# Patient Record
Sex: MALE | Race: WHITE
[De-identification: names, ages, dates, MRNs, and addresses within clinical notes are randomized per-mention and may not be internally consistent; named-entity substitution may affect disease eponyms.]

---

## 2019-12-26 ENCOUNTER — HOSPITAL ENCOUNTER (INPATIENT)
Dept: HOSPITAL 47 - EC | Age: 54
LOS: 3 days | Discharge: HOME | DRG: 603 | End: 2019-12-29
Attending: HOSPITALIST | Admitting: HOSPITALIST
Payer: COMMERCIAL

## 2019-12-26 DIAGNOSIS — E66.9: ICD-10-CM

## 2019-12-26 DIAGNOSIS — B35.1: ICD-10-CM

## 2019-12-26 DIAGNOSIS — F31.9: ICD-10-CM

## 2019-12-26 DIAGNOSIS — J45.909: ICD-10-CM

## 2019-12-26 DIAGNOSIS — D63.8: ICD-10-CM

## 2019-12-26 DIAGNOSIS — F65.2: ICD-10-CM

## 2019-12-26 DIAGNOSIS — K59.09: ICD-10-CM

## 2019-12-26 DIAGNOSIS — G40.909: ICD-10-CM

## 2019-12-26 DIAGNOSIS — F70: ICD-10-CM

## 2019-12-26 DIAGNOSIS — K21.9: ICD-10-CM

## 2019-12-26 DIAGNOSIS — Z88.0: ICD-10-CM

## 2019-12-26 DIAGNOSIS — J90: ICD-10-CM

## 2019-12-26 DIAGNOSIS — Z79.899: ICD-10-CM

## 2019-12-26 DIAGNOSIS — L03.115: Primary | ICD-10-CM

## 2019-12-26 DIAGNOSIS — Z88.8: ICD-10-CM

## 2019-12-26 DIAGNOSIS — J98.11: ICD-10-CM

## 2019-12-26 DIAGNOSIS — H91.90: ICD-10-CM

## 2019-12-26 DIAGNOSIS — N17.9: ICD-10-CM

## 2019-12-26 DIAGNOSIS — Z79.1: ICD-10-CM

## 2019-12-26 LAB — VANCOMYCIN SERPL-MCNC: 9.2 UG/ML

## 2019-12-26 PROCEDURE — 80202 ASSAY OF VANCOMYCIN: CPT

## 2019-12-26 PROCEDURE — 85027 COMPLETE CBC AUTOMATED: CPT

## 2019-12-26 PROCEDURE — 87040 BLOOD CULTURE FOR BACTERIA: CPT

## 2019-12-26 PROCEDURE — 80048 BASIC METABOLIC PNL TOTAL CA: CPT

## 2019-12-26 PROCEDURE — 71046 X-RAY EXAM CHEST 2 VIEWS: CPT

## 2019-12-26 PROCEDURE — 82565 ASSAY OF CREATININE: CPT

## 2019-12-26 PROCEDURE — 99285 EMERGENCY DEPT VISIT HI MDM: CPT

## 2019-12-26 RX ADMIN — DIVALPROEX SODIUM SCH MG: 500 TABLET, DELAYED RELEASE ORAL at 20:47

## 2019-12-26 RX ADMIN — CEFEPIME HYDROCHLORIDE SCH MLS/HR: 1 INJECTION, POWDER, FOR SOLUTION INTRAMUSCULAR; INTRAVENOUS at 22:21

## 2019-12-26 RX ADMIN — DIVALPROEX SODIUM SCH MG: 250 TABLET, DELAYED RELEASE ORAL at 20:47

## 2019-12-26 RX ADMIN — BACITRACIN ZINC, NEOMYCIN, POLYMYXIN B SCH: 400; 3.5; 5 OINTMENT TOPICAL at 20:54

## 2019-12-26 RX ADMIN — SODIUM CHLORIDE SCH MLS/HR: 9 INJECTION, SOLUTION INTRAVENOUS at 23:02

## 2019-12-26 RX ADMIN — BUPROPION HYDROCHLORIDE SCH MG: 100 TABLET, FILM COATED, EXTENDED RELEASE ORAL at 20:46

## 2019-12-26 RX ADMIN — TAMSULOSIN HYDROCHLORIDE SCH MG: 0.4 CAPSULE ORAL at 20:46

## 2019-12-26 RX ADMIN — BENZTROPINE MESYLATE SCH MG: 0.5 TABLET ORAL at 20:46

## 2019-12-26 RX ADMIN — IPRATROPIUM BROMIDE AND ALBUTEROL SULFATE SCH: .5; 3 SOLUTION RESPIRATORY (INHALATION) at 20:59

## 2019-12-26 NOTE — XR
EXAMINATION TYPE: XR chest 2V

 

DATE OF EXAM: 12/26/2019

 

COMPARISON: 12/26/2019

 

HISTORY: Chest pain. Cough.

 

TECHNIQUE: 2 views

 

FINDINGS: There is some atelectasis at the lung bases. There is no heart failure. There is poor inspi
ration. Bony thorax is intact.

 

IMPRESSION: There is some mild infiltrate and atelectasis at the lung bases and mainly on the left si
de that is slightly worse than exam 6 hours ago. No heart failure.

## 2019-12-26 NOTE — ED
Recheck HPI





- General


Chief Complaint: Shortness of Breath


Stated Complaint: R lower leg celulitis, Pneumonia


Time Seen by Provider: 12/26/19 16:21


Source: EMS, RN notes reviewed


Mode of arrival: EMS


Limitations: altered mental status





- Related Data


                                Home Medications











 Medication  Instructions  Recorded  Confirmed


 


ALPRAZolam [Xanax] 0.5 mg PO TID PRN 06/22/14 06/22/14


 


Benztropine Mesylate 1 mg PO QAM 06/22/14 06/22/14


 


Benztropine Mesylate 2 mg PO HS 06/22/14 06/22/14


 


Divalproex Sodium 250 mg PO HS 06/22/14 06/22/14


 


Divalproex Sodium [Divalproex 500 mg PO HS 06/22/14 06/22/14





Sodium ER]   


 


FLUoxetine HCL [PROzac] 20 mg PO DAILY 06/22/14 06/22/14


 


Tamsulosin HCl [Flomax] 0.4 mg PO DAILY 06/22/14 06/22/14


 


buPROPion SR [Wellbutrin SR] 100 mg PO BID 06/22/14 06/22/14


 


risperiDONE [RisperDAL] 2 mg PO BID 06/22/14 06/22/14











                                    Allergies











Allergy/AdvReac Type Severity Reaction Status Date / Time


 


Penicillins Allergy  Unknown Verified 12/26/19 16:00


 


thioridazine HCl Allergy  Unknown Verified 12/26/19 16:00





[From [facility]]     














Review of Systems


ROS Statement: 


Those systems with pertinent positive or pertinent negative responses have been 

documented in the HPI.





ROS Other: All systems not noted in ROS Statement are negative.





Past Medical History


Past Medical History: Asthma, GERD/Reflux


Additional Past Medical History / Comment(s): MILD MENTAL RETARDATION, 

EXHIBITIONISM, CONSTIPATION, MILD HEARING LOSS, FNGAL TOE NAILS


History of Any Multi-Drug Resistant Organisms: None Reported


Past Surgical History: Orthopedic Surgery


Past Psychological History: Bipolar


Smoking Status: Never smoker


Past Alcohol Use History: None Reported


Past Drug Use History: None Reported





General Exam


Limitations: altered mental status





Course


                                   Vital Signs











  12/26/19 12/26/19 12/26/19





  16:00 16:22 18:07


 


Temperature 99.0 F  


 


Pulse Rate 100  


 


Respiratory 22 20 18





Rate   


 


Blood Pressure 130/92  


 


O2 Sat by Pulse 96  





Oximetry   














Disposition


Clinical Impression: 


 Acute exacerbation of chronic obstructive pulmonary disease, Community acquired

pneumonia, Cellulitis of right leg, Nosocomial pneumonia





Disposition: ADMITTED AS IP TO THIS HOSP


Condition: Fair


Is patient prescribed a controlled substance at d/c from ED?: No

## 2019-12-26 NOTE — P.HPIM
History of Present Illness


49-year-old male who was sent in here for the treatment of right lower leg 

Cellulitis and possible laceration pneumonia.  Patient denied and effusions 

patient denied any cough evidently he was comparing of cough and shortness of 

breath he denied any symptoms to me.  Patient had a chest x-ray which were 

showing bilateral infiltrates as per the reading unfortunately I am unable to 

open of the images.  Because of which I'll continue antibody for now until I can

review the images.  Patient was started on vancomycin.  Patient was on Keflex as

an outpatient with some improvement insulin this patient has significant redness

local is of temperature light of the right lower exudative patient does have 

until dilation but tach and appropriately answer questions.  Patient baseline 

creatinine is around 1 his creatinine presently is 1.5 patient was started on IV

fluids








Review of Systems








REVIEW OF SYSTEMS: 


CONSTITUTIONAL: No fever, no malaise, no fatigue. 


HEENT: No recent visual problems or hearing problems. Denied any sore throat. 


CARDIOVASCULAR: No chest pain, orthopnea, PND, no palpitations, no syncope. 


PULMONARY: No shortness of breath, no cough, no hemoptysis. 


GASTROINTESTINAL: No diarrhea, no nausea, no vomiting, no abdominal pain. 


NEUROLOGICAL: No headaches, no weakness, no numbness. 


HEMATOLOGICAL: Denies any bleeding or petechiae. 


GENITOURINARY: Denies any burning micturition, frequency, or urgency. 


MUSCULOSKELETAL/RHEUMATOLOGICAL: Denies any joint pain, swelling, or any muscle 

pain.  It doesn't right lower exudate as mentioned above


ENDOCRINE: Denies any polyuria or polydipsia. 





The rest of the 14-point review of systems is negative.











Past Medical History


Past Medical History: Asthma, GERD/Reflux


Additional Past Medical History / Comment(s): MILD MENTAL RETARDATION, 

EXHIBITIONISM, CONSTIPATION, MILD HEARING LOSS, FNGAL TOE NAILS


History of Any Multi-Drug Resistant Organisms: None Reported


Past Surgical History: Orthopedic Surgery


Past Psychological History: Bipolar


Smoking Status: Never smoker


Past Alcohol Use History: None Reported


Past Drug Use History: None Reported





Medications and Allergies


                                Home Medications











 Medication  Instructions  Recorded  Confirmed  Type


 


ALPRAZolam [Xanax] 0.5 mg PO TID@0600,1700,2100 06/22/14 12/26/19 History


 


Divalproex Sodium 250 mg PO HS@2100 06/22/14 12/26/19 History


 


FLUoxetine HCL [PROzac] 20 mg PO HS@2100 06/22/14 12/26/19 History


 


Tamsulosin HCl [Flomax] 0.4 mg PO HS@2100 06/22/14 12/26/19 History


 


buPROPion SR [Wellbutrin SR] 100 mg PO BID@0600,2100 06/22/14 12/26/19 History


 


Acetaminophen Tab [Tylenol Tab] 1,000 mg PO Q4H PRN 12/26/19 12/26/19 History


 


Benztropine Mesylate [Cogentin] 0.5 mg PO TID@0600,1700,2100 12/26/19 12/26/19 

History


 


Divalproex Sodium [Depakote] 1,000 mg PO BID@0600,2100 12/26/19 12/26/19 History


 


Docusate [Colace] 100 mg PO BID PRN 12/26/19 12/26/19 History


 


FLUoxetine HCL [PROzac] 40 mg PO DAILY@0600 12/26/19 12/26/19 History


 


Meloxicam [Mobic] 15 mg PO DAILY@0600 12/26/19 12/26/19 History


 


Multivitamins, Thera [Multivitamin 1 tab PO DAILY@0600 12/26/19 12/26/19 History





(formulary)]    


 


Neomycin/Bacitracin/Polymyxinb 1 applic TOPICAL TID 12/26/19 12/26/19 History





[Neosporin Ointment]    


 


Nitrofurantoin Monohyd/M-Cryst 100 mg PO BID@0600,2100 12/26/19 12/26/19 History





[Macrobid]    


 


Polyethylene Glycol 3350 [Miralax] 17 gm PO BID PRN 12/26/19 12/26/19 History


 


diphenhydrAM/PE/DM/ACETAMIN/GG 2 tab PO Q4H PRN 12/26/19 12/26/19 History





[Mucinex Fast-Max Day-Nite Cold]    


 


risperiDONE [RisperDAL] 4 mg PO BID@0600,2100 12/26/19 12/26/19 History








                                    Allergies











Allergy/AdvReac Type Severity Reaction Status Date / Time


 


Penicillins Allergy  Unknown Verified 12/26/19 18:38


 


thioridazine HCl Allergy  Unknown Verified 12/26/19 18:38





[From [facility]]     














Physical Exam


Vitals: 


                                   Vital Signs











  Temp Pulse Resp BP Pulse Ox


 


 12/26/19 18:45  99.6 F  77  20  138/75  96


 


 12/26/19 18:07    18  


 


 12/26/19 16:22    20  


 


 12/26/19 16:00  99.0 F  100  22  130/92  96








                                Intake and Output











 12/26/19 12/26/19 12/26/19





 06:59 14:59 22:59


 


Other:   


 


  Weight   97.522 kg




















PHYSICAL EXAMINATION: 





GENERAL: The patient is alert and oriented x3, not in any acute distress. Well 

developed, well nourished. 


HEENT: Pupils are round and equally reacting to light. EOMI. No scleral icterus.

No conjunctival pallor. Normocephalic, atraumatic. No pharyngeal erythema. No 

thyromegaly. 


CARDIOVASCULAR: S1 and S2 present. No murmurs, rubs, or gallops. 


PULMONARY: Chest is clear to auscultation, no wheezing or crackles. 


ABDOMEN: Soft, nontender, nondistended, normoactive bowel sounds. No palpable 

organomegaly. 


MUSCULOSKELETAL: No joint swelling or deformity.


EXTREMITIES: No cyanosis, clubbing, or pedal edema.  Has redness in the right 

leg


NEUROLOGICAL: Gross neurological examination did not reveal any focal deficits. 


SKIN: No rashes. 














Assessment and Plan


Plan: 


-Celexa the lower exudate: Patient will be started on Vanco mycin patient failed

outpatient therapy infectious disease will be consulted 


acute renal failure  prerenal azotemia expected given to improve with IV fluids 

and repeat basic metabolic profile will be obtained


-mental retardation and patient will because of which patient is on multiple 

antipsychotic medications which will be resumed and continued


3 chronic constipation


-Seizure disorder


-Gastroesophageal reflux disease


-Possibility of pneumonia cannot completely rule it out patient will be can you 

done cefepime for now but I do not believe levofloxacin is deciliter liver this 

can you patient is ALLERGIC to penicillins I reviewed the images tomorrow as I 

cannot open the images today until then cefepime will be continued.

## 2019-12-27 LAB
ANION GAP SERPL CALC-SCNC: 8 MMOL/L
BUN SERPL-SCNC: 27 MG/DL (ref 9–20)
CALCIUM SPEC-MCNC: 8.7 MG/DL (ref 8.4–10.2)
CHLORIDE SERPL-SCNC: 103 MMOL/L (ref 98–107)
CO2 SERPL-SCNC: 25 MMOL/L (ref 22–30)
ERYTHROCYTE [DISTWIDTH] IN BLOOD BY AUTOMATED COUNT: 3.35 M/UL (ref 4.3–5.9)
ERYTHROCYTE [DISTWIDTH] IN BLOOD: 12.7 % (ref 11.5–15.5)
GLUCOSE SERPL-MCNC: 91 MG/DL (ref 74–99)
HCT VFR BLD AUTO: 31.7 % (ref 39–53)
HGB BLD-MCNC: 10.7 GM/DL (ref 13–17.5)
MCH RBC QN AUTO: 31.9 PG (ref 25–35)
MCHC RBC AUTO-ENTMCNC: 33.7 G/DL (ref 31–37)
MCV RBC AUTO: 94.6 FL (ref 80–100)
PLATELET # BLD AUTO: 197 K/UL (ref 150–450)
POTASSIUM SERPL-SCNC: 4.2 MMOL/L (ref 3.5–5.1)
SODIUM SERPL-SCNC: 136 MMOL/L (ref 137–145)
WBC # BLD AUTO: 17 K/UL (ref 3.8–10.6)

## 2019-12-27 RX ADMIN — CEFEPIME HYDROCHLORIDE SCH MLS/HR: 1 INJECTION, POWDER, FOR SOLUTION INTRAMUSCULAR; INTRAVENOUS at 10:01

## 2019-12-27 RX ADMIN — TAMSULOSIN HYDROCHLORIDE SCH MG: 0.4 CAPSULE ORAL at 21:58

## 2019-12-27 RX ADMIN — IPRATROPIUM BROMIDE AND ALBUTEROL SULFATE SCH: .5; 3 SOLUTION RESPIRATORY (INHALATION) at 08:08

## 2019-12-27 RX ADMIN — SODIUM CHLORIDE SCH MLS/HR: 9 INJECTION, SOLUTION INTRAVENOUS at 10:04

## 2019-12-27 RX ADMIN — DIVALPROEX SODIUM SCH MG: 250 TABLET, DELAYED RELEASE ORAL at 21:59

## 2019-12-27 RX ADMIN — IPRATROPIUM BROMIDE AND ALBUTEROL SULFATE SCH: .5; 3 SOLUTION RESPIRATORY (INHALATION) at 20:55

## 2019-12-27 RX ADMIN — DIVALPROEX SODIUM SCH MG: 500 TABLET, DELAYED RELEASE ORAL at 22:00

## 2019-12-27 RX ADMIN — BENZTROPINE MESYLATE SCH MG: 0.5 TABLET ORAL at 16:41

## 2019-12-27 RX ADMIN — BENZTROPINE MESYLATE SCH MG: 0.5 TABLET ORAL at 21:59

## 2019-12-27 RX ADMIN — IPRATROPIUM BROMIDE AND ALBUTEROL SULFATE SCH: .5; 3 SOLUTION RESPIRATORY (INHALATION) at 12:20

## 2019-12-27 RX ADMIN — BACITRACIN ZINC, NEOMYCIN, POLYMYXIN B SCH APPLIC: 400; 3.5; 5 OINTMENT TOPICAL at 21:58

## 2019-12-27 RX ADMIN — IPRATROPIUM BROMIDE AND ALBUTEROL SULFATE SCH: .5; 3 SOLUTION RESPIRATORY (INHALATION) at 16:18

## 2019-12-27 RX ADMIN — BUPROPION HYDROCHLORIDE SCH MG: 100 TABLET, FILM COATED, EXTENDED RELEASE ORAL at 05:41

## 2019-12-27 RX ADMIN — BUPROPION HYDROCHLORIDE SCH MG: 100 TABLET, FILM COATED, EXTENDED RELEASE ORAL at 21:58

## 2019-12-27 RX ADMIN — ENOXAPARIN SODIUM SCH MG: 40 INJECTION SUBCUTANEOUS at 10:03

## 2019-12-27 RX ADMIN — DIVALPROEX SODIUM SCH MG: 500 TABLET, DELAYED RELEASE ORAL at 05:41

## 2019-12-27 RX ADMIN — BACITRACIN ZINC, NEOMYCIN, POLYMYXIN B SCH: 400; 3.5; 5 OINTMENT TOPICAL at 10:03

## 2019-12-27 RX ADMIN — BENZTROPINE MESYLATE SCH MG: 0.5 TABLET ORAL at 05:41

## 2019-12-27 RX ADMIN — BACITRACIN ZINC, NEOMYCIN, POLYMYXIN B SCH: 400; 3.5; 5 OINTMENT TOPICAL at 16:34

## 2019-12-27 NOTE — P.PN
Subjective





Patient was admitted facilities of the right lower extremity which is 

significantly improved patient was also believed to have aspiration pneumonia 

although patient doesn't have any clinical symptoms of that and I reviewed the 

chest x-ray do not believe patient has aspiration pneumonia from that patient 

does have some chronic changes with just some small mild bilateral pleural 

effusions.  Patient's vancomycin.  He will be discontinued and patient was 

started on ceftezolin and he probably can be discharged tomorrow on Keflex.





Constitutional: Denied any fatigue denied any fever.


Cardio vascular: denied any chest pain, palpitations


Gastrointestinal denied any nausea vomiting


Pulmonary: Denied any shortness of breath cough


Neurologic denied any new focal deficits





All inpatient medications were reviewed and appropriate changes in these med

ications as dictated in the interval history and assessment and plan.





Objective





- Vital Signs


Vital signs: 


                                   Vital Signs











Temp  97.1 F L  12/27/19 12:06


 


Pulse  91   12/27/19 12:06


 


Resp  20   12/27/19 12:06


 


BP  138/79   12/27/19 12:06


 


Pulse Ox  95   12/27/19 12:06








                                 Intake & Output











 12/26/19 12/27/19 12/27/19





 18:59 06:59 18:59


 


Intake Total  550 


 


Balance  550 


 


Weight 102 kg  


 


Intake:   


 


  Intake, IV Titration  550 





  Amount   


 


    Cefepime 1 gm In Sodium  50 





    Chloride 0.9% 50 ml @ 100   





    mls/hr IVPB Q12HR [facility] Rx   





    #:[id number]   


 


    Vancomycin 2,000 mg In  500 





    Sodium Chloride 0.9% 500   





    ml 500 ml @ 167 mls/hr   





    IVPB Q12HR [facility] Rx#:   





    [id number]   


 


Other:   


 


  Voiding Method  Toilet Toilet


 


  # Voids  1 1


 


  # Bowel Movements   1














- Exam





PHYSICAL EXAMINATION: 





GENERAL: The patient is alert and oriented x3, not in any acute distress. Well 

developed, well nourished. 


HEENT: Pupils are round and equally reacting to light. EOMI. No scleral icterus.

No conjunctival pallor. Normocephalic, atraumatic. No pharyngeal erythema. No 

thyromegaly. 


CARDIOVASCULAR: S1 and S2 present. No murmurs, rubs, or gallops. 


PULMONARY: Chest is clear to auscultation, no wheezing or crackles. 


ABDOMEN: Soft, nontender, nondistended, normoactive bowel sounds. No palpable 

organomegaly. 


MUSCULOSKELETAL: No joint swelling or deformity.


EXTREMITIES: No cyanosis, clubbing, or pedal edema.  Significant improvement in 

the circumferential redness of the right leg


NEUROLOGICAL: Gross neurological examination did not reveal any focal deficits. 


SKIN: No rashes. 














- Labs


CBC & Chem 7: 


                                 12/27/19 08:33





                                 12/27/19 08:33


Labs: 


                  Abnormal Lab Results - Last 24 Hours (Table)











  12/26/19 12/27/19 12/27/19 Range/Units





  19:24 08:33 08:33 


 


WBC   17.0 H   (3.8-10.6)  k/uL


 


RBC   3.35 L   (4.30-5.90)  m/uL


 


Hgb   10.7 L   (13.0-17.5)  gm/dL


 


Hct   31.7 L   (39.0-53.0)  %


 


Sodium    136 L  (137-145)  mmol/L


 


BUN    27 H  (9-20)  mg/dL


 


Creatinine  1.31 H    (0.66-1.25)  mg/dL














Assessment and Plan


Plan: 


-Cellulitis of the right lower extremity improving symptoms antibiotics as 

mentioned above


 chronic constipation


-Seizure disorder


-Gastroesophageal reflux disease


-Cefepime was discontinued as there is no evidence of aspiration pneumonia

## 2019-12-27 NOTE — XR
EXAMINATION TYPE: XR chest 2V

 

DATE OF EXAM: 12/27/2019

 

COMPARISON: 12/26/2019

 

HISTORY: Shortness of breath

 

TECHNIQUE:  Frontal and lateral views of the chest are obtained.

 

FINDINGS:

 

Scattered senescent parenchymal changes noted. Hyperinflation compatible with COPD. 

 

Patchy basilar infiltrates persist. Correlate for pneumonia. Progress studies are advised

 

Heart size is stable.

 

Mediastinal structures are stable and grossly unremarkable.

 

No evidence for hilar prominence.

 

Degenerative changes dorsal spine. 

 

IMPRESSION:

1. Patchy basilar infiltrates persist. Correlate for pneumonia. Progress studies are advised

## 2019-12-28 LAB
ANION GAP SERPL CALC-SCNC: 7 MMOL/L
BUN SERPL-SCNC: 19 MG/DL (ref 9–20)
CALCIUM SPEC-MCNC: 9 MG/DL (ref 8.4–10.2)
CHLORIDE SERPL-SCNC: 103 MMOL/L (ref 98–107)
CO2 SERPL-SCNC: 27 MMOL/L (ref 22–30)
ERYTHROCYTE [DISTWIDTH] IN BLOOD BY AUTOMATED COUNT: 3.58 M/UL (ref 4.3–5.9)
ERYTHROCYTE [DISTWIDTH] IN BLOOD: 12.7 % (ref 11.5–15.5)
GLUCOSE SERPL-MCNC: 94 MG/DL (ref 74–99)
HCT VFR BLD AUTO: 34.6 % (ref 39–53)
HGB BLD-MCNC: 11.1 GM/DL (ref 13–17.5)
MCH RBC QN AUTO: 31.1 PG (ref 25–35)
MCHC RBC AUTO-ENTMCNC: 32.2 G/DL (ref 31–37)
MCV RBC AUTO: 96.6 FL (ref 80–100)
PLATELET # BLD AUTO: 194 K/UL (ref 150–450)
POTASSIUM SERPL-SCNC: 3.7 MMOL/L (ref 3.5–5.1)
SODIUM SERPL-SCNC: 137 MMOL/L (ref 137–145)
WBC # BLD AUTO: 13.3 K/UL (ref 3.8–10.6)

## 2019-12-28 RX ADMIN — BENZTROPINE MESYLATE SCH MG: 0.5 TABLET ORAL at 17:30

## 2019-12-28 RX ADMIN — BENZTROPINE MESYLATE SCH MG: 0.5 TABLET ORAL at 21:05

## 2019-12-28 RX ADMIN — TRIAMCINOLONE ACETONIDE SCH APPLIC: 1 CREAM TOPICAL at 16:52

## 2019-12-28 RX ADMIN — BENZTROPINE MESYLATE SCH MG: 0.5 TABLET ORAL at 06:11

## 2019-12-28 RX ADMIN — DIVALPROEX SODIUM SCH MG: 250 TABLET, DELAYED RELEASE ORAL at 21:04

## 2019-12-28 RX ADMIN — BUPROPION HYDROCHLORIDE SCH MG: 100 TABLET, FILM COATED, EXTENDED RELEASE ORAL at 06:10

## 2019-12-28 RX ADMIN — DIVALPROEX SODIUM SCH MG: 500 TABLET, DELAYED RELEASE ORAL at 06:11

## 2019-12-28 RX ADMIN — BACITRACIN ZINC, NEOMYCIN, POLYMYXIN B SCH APPLIC: 400; 3.5; 5 OINTMENT TOPICAL at 16:52

## 2019-12-28 RX ADMIN — IPRATROPIUM BROMIDE AND ALBUTEROL SULFATE SCH: .5; 3 SOLUTION RESPIRATORY (INHALATION) at 08:54

## 2019-12-28 RX ADMIN — BACITRACIN ZINC, NEOMYCIN, POLYMYXIN B SCH APPLIC: 400; 3.5; 5 OINTMENT TOPICAL at 08:27

## 2019-12-28 RX ADMIN — NITROFURANTOIN (MONOHYDRATE/MACROCRYSTALS) SCH MG: 75; 25 CAPSULE ORAL at 21:03

## 2019-12-28 RX ADMIN — IPRATROPIUM BROMIDE AND ALBUTEROL SULFATE SCH: .5; 3 SOLUTION RESPIRATORY (INHALATION) at 22:15

## 2019-12-28 RX ADMIN — TAMSULOSIN HYDROCHLORIDE SCH MG: 0.4 CAPSULE ORAL at 21:03

## 2019-12-28 RX ADMIN — ENOXAPARIN SODIUM SCH MG: 40 INJECTION SUBCUTANEOUS at 08:27

## 2019-12-28 RX ADMIN — BACITRACIN ZINC, NEOMYCIN, POLYMYXIN B SCH APPLIC: 400; 3.5; 5 OINTMENT TOPICAL at 21:06

## 2019-12-28 RX ADMIN — NYSTATIN SCH APPLIC: 100000 CREAM TOPICAL at 16:52

## 2019-12-28 RX ADMIN — BUPROPION HYDROCHLORIDE SCH MG: 100 TABLET, FILM COATED, EXTENDED RELEASE ORAL at 21:05

## 2019-12-28 RX ADMIN — IPRATROPIUM BROMIDE AND ALBUTEROL SULFATE SCH: .5; 3 SOLUTION RESPIRATORY (INHALATION) at 12:05

## 2019-12-28 RX ADMIN — DIVALPROEX SODIUM SCH MG: 500 TABLET, DELAYED RELEASE ORAL at 21:04

## 2019-12-28 RX ADMIN — IPRATROPIUM BROMIDE AND ALBUTEROL SULFATE SCH: .5; 3 SOLUTION RESPIRATORY (INHALATION) at 17:04

## 2019-12-28 NOTE — CONS
CONSULTATION



DATE OF SERVICE:

12/27/2019.



REASON FOR CONSULTATION:

Right lower extremity cellulitis with question of pneumonia.



HISTORY OF PRESENT ILLNESS:

The patient is a 54-year-old  male, apparently initially evaluated at an

outside facility where the patient presented with increasing shortness of breath and

cough and swelling and redness of his right lower extremity that apparently started a

few days before he presented to hospital.  The patient denies any history of any

trauma.  Pain to the right leg is more of a dull aching 2 to 3 out of 10 and no

radiation with associated swelling, redness, currently with no open wound or any

drainage and no history of any trauma.  The patient also complaining of some shortness

of breath.  Chest x-ray at an outside facility shows bilateral basilar infiltrate.  The

patient did have a low-grade fever and elevated white count.  The patient has been

diagnosed with right lower extremity cellulitis as well as pneumonia.  The patient has

been subsequently transferred to the [facility] for further management of his

condition.  The patient was started on vancomycin, cefepime.  Infectious Disease was

consulted for further recommendations regarding antibiotic therapy.



REVIEW OF SYSTEMS:

Positive points have been mentioned in HPI.  Rest of systems negative.



PAST MEDICAL HISTORY:

Past medical history significant for mild mental retardation, history of asthma and

gastroesophageal reflux disease, constipation, and bipolar disorder.



PAST SURGICAL HISTORY:

Orthopedic surgeries.



SOCIAL HISTORY:

No history of smoking, drinking or drug use.



FAMILY HISTORY:

No pertinent findings noticed.



ALLERGIES:

TO PENICILLIN, however, has tolerated cephalosporins without any problem.



MEDICATIONS:

Include the patient is currently on Tylenol, Ventolin, DuoNeb, Xanax, Cogentin,

Wellbutrin, cefazolin 2 g q.8 hours, Depakote, Lovenox,  Prozac,  MiraLAX, Risperdal.



PHYSICAL EXAMINATION:

Blood pressure is 131/81 with a pulse of 76, temperature 98.2.  He is 93% on room air.

General description is a middle-aged male lying in bed in no distress.  No tachypnea or

accessory muscles of respiration use.

HEENT: Examination shows slight pallor.  No scleral icterus.  Oral mucosal membranes

are moist with no pharyngeal erythema or thrush.  Neck:  Trachea central.  No

thyromegaly.  Lungs:  Unlabored breathing.  Clear to auscultation.  No wheeze or

crackles.  Heart S1, S2.  Regular rate and rhythm.

ABDOMEN:  Soft, no tenderness.  No guarding or rigidity.  Extremities:  Right leg

mostly posteriorly did have swelling and redness.  Warm to touch.  No open wound or any

drainage.  Neurological:  Patient is awake, alert, oriented times three.  Mood and

affect normal.



LABS:

Hemoglobin is 10.7, white count 65424.  BUN of 27, creatinine 1.14.  Chest x-ray with

mostly bibasilar atelectasis.



DIAGNOSTIC IMPRESSION AND PLAN:

1. Patient admitted to the hospital with increasing pain, swelling, redness of right

    lower extremity, likely with cellulitis with diffuse swelling and redness, likely

    streptococcal disease.  Clinically doubt MRSA gram-negative infection.

2. The patient with some shortness of breath, currently breathing comfortably on room

    air with no fever.  Chest x-ray finding more likely representing atelectasis rather

    than pneumonia.



PLAN:

1. Cefepime.  Vancomycin discontinued as per discussion with the admitting team.

2. We will start the patient on cefazolin 2 g q.8 hours.

3. Marked the area of redness on the right leg.

4. We will follow up on clinical condition and culture to further adjust medication if

    needed.

Thank you for this consultation. We will follow this patient along with you.





MMODL / IJN: [id number] / Job#: [id number]

## 2019-12-28 NOTE — PN
PROGRESS NOTE



DATE OF SERVICE:

11/28/2019



This 54-year-old gentleman was admitted with significant cellulitis of the right lower

extremity being treated with IV antibiotics.  The patient was also suspected to have

aspiration pneumonia. Chest x-ray showed possibly atelectasis.  The patient is being

closely monitored at this time.  The patient is on empiric bronchodilators also.

Patient closely monitored.



PAST MEDICAL HISTORY:

Reviewed.



REVIEW OF SYSTEMS:

CARDIOVASCULAR SYSTEM:  No palpitations.

RESPIRATORY:  As mentioned earlier.

GI: No nausea.

:  No dysuria.

NERVOUS SYSTEM: No numbness or weakness.



CURRENT MEDICATIONS:

1. Tylenol.

2. Ventolin.

3. DuoNeb nebulizer.

4. Xanax.

5. Cogentin.

6. Wellbutrin XL.

7. Cefazolin.

8. Depakote.

9. Colace.

10.Lovenox.

11.Prozac.

12.Replacement protocol.

13.Risperdal.

14.Flomax.

15.Kenalog.

Dose and schedule as well as route of administration was reviewed.



PHYSICAL EXAM:

Patient is alert, oriented x3.  Pulse 72, blood pressure 139/75, respirations 17,

temperature 97.4, pulse ox 94% on room air skin:

HEENT:  Conjunctivae normal. Oral mucosa moist.

NECK:  No jugular venous distention.  No lymph node enlargement.

CARDIOVASCULAR:  S1, S2.

RESPIRATORY: Diminished breath sounds at the bases. Bilateral scattered rhonchi and

crackles.

ABDOMEN: Soft, nontender.  No masses palpable.

LEGS: Right leg cellulitis present.

NERVOUS SYSTEM: No focal deficits.



LABS:

At this time shows WBC 13.2, hemoglobin 11.3.



ASSESSMENT:

1. Acute severe cellulitis of the right leg with possible SIRS.

2. Increased WBC.

3. Atelectasis, pneumonia unlikely.

4. Seizure disorder.

5. Gastroesophageal reflux disease.

6. Increased WBC.

7. Anemia of chronic disease.

8. Obesity with body mass index 30.5.

9. History of asthma.

10.Mild mental retardation and extubation.

11.History of constipation.

12.History of mild hearing loss.

13.History of bipolar.



RECOMMENDATIONS AND DISCUSSION:

In this 54-year-old gentleman who presented with multiple complex medical issues, we

will monitor the patient closely, continue the current medication and symptomatic

treatment.  Will continue the broad-spectrum IV antibiotics.  Continue the

bronchodilators.  Continue rest of medications.  DVT prophylaxis.  Otherwise, resume

the home medications.  Closely follow with Infectious Disease and further

recommendations to follow.





MMODL / IJN: [id number] / Job#: [id number]

## 2019-12-28 NOTE — PN
PROGRESS NOTE



DATE OF SERVICE:

12/28/2019



REASON FOR FOLLOWUP:

Right lower extremity cellulitis.



INTERVAL HISTORY:

The patient is currently afebrile.  The patient is breathing comfortably.  The patient

denies having any chest pain or any cough.  No nausea, vomiting.  No abdominal pain and

_____





MMODL / IJN: [id number] / Job#: [id number]

## 2019-12-29 VITALS
TEMPERATURE: 98.8 F | RESPIRATION RATE: 17 BRPM | SYSTOLIC BLOOD PRESSURE: 124 MMHG | HEART RATE: 93 BPM | DIASTOLIC BLOOD PRESSURE: 76 MMHG

## 2019-12-29 RX ADMIN — DIVALPROEX SODIUM SCH MG: 500 TABLET, DELAYED RELEASE ORAL at 05:22

## 2019-12-29 RX ADMIN — TRIAMCINOLONE ACETONIDE SCH APPLIC: 1 CREAM TOPICAL at 07:54

## 2019-12-29 RX ADMIN — BACITRACIN ZINC, NEOMYCIN, POLYMYXIN B SCH APPLIC: 400; 3.5; 5 OINTMENT TOPICAL at 07:54

## 2019-12-29 RX ADMIN — BENZTROPINE MESYLATE SCH MG: 0.5 TABLET ORAL at 16:32

## 2019-12-29 RX ADMIN — BENZTROPINE MESYLATE SCH MG: 0.5 TABLET ORAL at 05:23

## 2019-12-29 RX ADMIN — NITROFURANTOIN (MONOHYDRATE/MACROCRYSTALS) SCH MG: 75; 25 CAPSULE ORAL at 05:22

## 2019-12-29 RX ADMIN — IPRATROPIUM BROMIDE AND ALBUTEROL SULFATE SCH: .5; 3 SOLUTION RESPIRATORY (INHALATION) at 16:20

## 2019-12-29 RX ADMIN — IPRATROPIUM BROMIDE AND ALBUTEROL SULFATE SCH: .5; 3 SOLUTION RESPIRATORY (INHALATION) at 10:39

## 2019-12-29 RX ADMIN — NYSTATIN SCH APPLIC: 100000 CREAM TOPICAL at 07:54

## 2019-12-29 RX ADMIN — BUPROPION HYDROCHLORIDE SCH MG: 100 TABLET, FILM COATED, EXTENDED RELEASE ORAL at 05:22

## 2019-12-29 RX ADMIN — ENOXAPARIN SODIUM SCH MG: 40 INJECTION SUBCUTANEOUS at 07:53

## 2019-12-29 RX ADMIN — IPRATROPIUM BROMIDE AND ALBUTEROL SULFATE SCH: .5; 3 SOLUTION RESPIRATORY (INHALATION) at 13:53

## 2019-12-29 NOTE — PN
PROGRESS NOTE



DATE OF SERVICE:

12/29/2019.



REASON FOR FOLLOWUP:

Right lower extremity cellulitis.



INTERVAL HISTORY:

The patient is currently afebrile, has been breathing comfortably.  Denies any chest

pain or any cough.  No nausea, vomiting or pain to the right leg area.



PHYSICAL EXAMINATION:

Blood pressure 124/76, pulse of 93, temperature 98.8.  He is 94% on room air. General

description is a middle-aged male up in the room in no distress.  Respiratory system:

Unlabored breathing,  clear to auscultation anteriorly.  Heart S1, S2.  Regular rate

and rhythm.  Abdomen soft, no tenderness. Right leg has been dressed up by the RN, did

mention overall improvement in the redness.



LABS:

Creatinine 0.90.  No CBC was done today.



DIAGNOSTIC IMPRESSION AND PLAN:

Patient with acute right lower extremity cellulitis with diffuse swelling with _____

disease, improvement on cefazolin.  Antibiotic was switched over to Keflex 500 mg p.o.

3 times a day for 7 days.  Script sent to pharmacy. Local care to continue with Mycolog

cream and Ace wrap to keep the swelling down.





MMODL / IJN: [id number] / Job#: [id number]

## 2019-12-30 NOTE — DS
DISCHARGE SUMMARY



DATE OF SERVICE:

12/29/2019



FINAL DIAGNOSES:

1. Acute severe cellulitis of the right leg with possible systemic inflammatory

    response syndrome.

2. Increased WBC.

3. Atelectasis, pneumonia unlikely.

4. Seizure disorder history.

5. History of gastroesophageal reflux disease.

6. Anemia of chronic disease.

7. Obesity with body mass index of 30.5.

8. History of asthma.

9. Mild mental retardation.

10.History of constipation.

11.History of mild hearing loss.

12.History of bipolar.



DISCHARGE DISPOSITION:

The patient will be discharged in stable condition with guarded prognosis.

Discharge cleared by Dr. iLnares, Infectious Disease.



HISTORY OF PRESENT ILLNESS:

This 54-year-old gentleman with a past medical history of multiple medical 
problems

admitted with acute severe cellulitis.  The patient was initially suspected 
pneumonia,

which was excluded and possible atelectasis considered.  Patient treated 
empirically

with antibiotics.  Patient improved significantly.  Dr. Linares saw the patient.

Recommended the patient to be discharged and follow up in the outpatient 
setting.



On exam, vitals are stable.

CARDIOVASCULAR: S1, S2 muffled.

ABDOMEN: Soft.

NERVOUS SYSTEM: No focal deficit.

EXAMINATION OF THE RIGHT LEG: Cellulitis present.



DISCHARGE ADVICE:

1. Diet is cardiac.

2. Activity limited until followup.

3. Follow up with Dr. Jacinto Rodriguez in 2 to 3 days.

4. Follow up with Infectious Disease as recommended.

Medications are:

1. Cogentin 0.5 mg p.o. t.i.d.

2. Colace p.r.n.

3. Depakote 1000 mg p.o. b.i.d. as before and 250 mg p.o. at bedtime.

4. Flomax 0.4 at bedtime.

5. Nitrofurantoin as before.

6. MiraLAX 17 grams p.o. b.i.d.

7. Mobic 15 mg p.o. daily.

8. Diphenhydramine DM p.r.n. as before.

9. Multivitamins 1 p.o. daily.

10.Neomycin bacitracin local application.

11.Prozac 20 mg p.o. at bedtime and 40 mg p.o. daily.

12.Risperdal 4 mg p.o. b.i.d.

13.Tylenol 1000 mg q.4 p.r.n.

14.Wellbutrin  mg p.o. .

15.Xanax 0.5 mg t.i.d.

16.Keflex 500 mg q.8 for 10 days.

17.Albuterol 2 puffs q.i.d. and p.r.n.

Once again the patient will be discharged in stable condition with guarded 
prognosis.





MMGADIEL / IJN: [id number] / Job#: [id number]

MTDD

## 2024-06-19 ENCOUNTER — HOSPITAL ENCOUNTER (OUTPATIENT)
Dept: HOSPITAL 47 - RADFLMAIN | Age: 59
Discharge: HOME | End: 2024-06-19
Attending: FAMILY MEDICINE
Payer: COMMERCIAL

## 2024-06-19 DIAGNOSIS — R13.12: Primary | ICD-10-CM

## 2024-06-19 PROCEDURE — 74230 X-RAY XM SWLNG FUNCJ C+: CPT

## 2024-06-19 NOTE — FL
-----------------------------

Exam Date: 6/19/2024 12:16 PM.

Modified barium swallow for dysphagia.

Consistencies administered: Various consistency of barium.

Fluoro time: 51 s

No images were sent to PACS. Please see speech pathology report.

DAP: Not reported mGym2 Gycm2